# Patient Record
Sex: MALE | Race: OTHER | HISPANIC OR LATINO | Employment: OTHER | URBAN - METROPOLITAN AREA
[De-identification: names, ages, dates, MRNs, and addresses within clinical notes are randomized per-mention and may not be internally consistent; named-entity substitution may affect disease eponyms.]

---

## 2024-02-14 DIAGNOSIS — K42.9 UMBILICAL HERNIA WITHOUT OBSTRUCTION AND WITHOUT GANGRENE: Primary | ICD-10-CM

## 2024-04-02 ENCOUNTER — OFFICE VISIT (OUTPATIENT)
Dept: SURGERY | Facility: CLINIC | Age: 59
End: 2024-04-02
Payer: MEDICAID

## 2024-04-02 VITALS
HEIGHT: 70 IN | RESPIRATION RATE: 19 BRPM | OXYGEN SATURATION: 97 % | DIASTOLIC BLOOD PRESSURE: 85 MMHG | BODY MASS INDEX: 34.65 KG/M2 | HEART RATE: 88 BPM | SYSTOLIC BLOOD PRESSURE: 123 MMHG | TEMPERATURE: 98 F | WEIGHT: 242 LBS

## 2024-04-02 DIAGNOSIS — K42.9 UMBILICAL HERNIA WITHOUT OBSTRUCTION AND WITHOUT GANGRENE: ICD-10-CM

## 2024-04-02 PROCEDURE — 99214 OFFICE O/P EST MOD 30 MIN: CPT | Mod: PBBFAC

## 2024-04-02 RX ORDER — PREDNISONE 20 MG/1
20 TABLET ORAL DAILY
COMMUNITY
Start: 2024-04-01 | End: 2024-04-06

## 2024-04-02 RX ORDER — CEFDINIR 300 MG/1
300 CAPSULE ORAL DAILY
COMMUNITY
Start: 2024-04-01 | End: 2024-04-08

## 2024-04-02 RX ORDER — PROMETHAZINE HYDROCHLORIDE AND DEXTROMETHORPHAN HYDROBROMIDE 6.25; 15 MG/5ML; MG/5ML
5 SYRUP ORAL 4 TIMES DAILY PRN
COMMUNITY
Start: 2024-04-01 | End: 2024-04-06

## 2024-04-02 NOTE — PROGRESS NOTES
General Surgery Clinic Note      CC: ***     Subjective  Akbar Roy is a 58 y.o. male PMH of nephrolithiasis and gout presents with concern for umbilical hernia. He noticed it 8 years ago and it has progressively grown in size. He stated normally the hernia isn't painful but when lifting or coughing it will bulge out more and become painful. 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 He is concerned that the hernia will continue to grow in size and eventually interfere with his work. He follows with family medicine for his PCP, and they referred him to surgery clinic for the hernia. The only medication he takes is ibuprofen occasionally for gout. He denies taking blood thinners. He denies other symptoms associated with the hernia such as fever, n/v, chills, bowel movement changes, and skin changes. He had a L inguinal hernia repair about 30 years ago. BMI 34.72     Review of Systems   Constitutional:  Negative for chills, diaphoresis, fever, malaise/fatigue and weight loss.   Gastrointestinal:  Negative for abdominal pain, constipation, diarrhea, heartburn, nausea and vomiting.   Skin:  Negative for itching and rash.        Color changes to area around umbilicus   Neurological:  Negative for weakness and headaches.        PMHx: History reviewed. No pertinent past medical history.  PSHx: History reviewed. No pertinent surgical history.  FHx: History reviewed. No pertinent family history.  Meds:   Current Outpatient Medications on File Prior to Visit   Medication Sig Dispense Refill    cefdinir (OMNICEF) 300 MG capsule Take 300 mg by mouth once daily.      predniSONE (DELTASONE) 20 MG tablet Take 20 mg by mouth once daily.      promethazine-dextromethorphan (PROMETHAZINE-DM) 6.25-15 mg/5 mL Syrp Take 5 mLs by mouth 4 (four) times daily as needed.       No current facility-administered medications on file prior to visit.     Social:   Cigarette smoking: tried smoking for 1  month at 17 years old  Alcohol: beer occasionally with meals  Illicit substances: none  Work: home/ building   Allergies: Review of patient's allergies indicates:  No Known Allergies     Objective  Vitals:    04/02/24 0839   BP: 123/85   Pulse:    Resp:    Temp:         Gen: Pt in NAD, resting comfortably  HEENT: Atraumatic, MMM, EOMs intact; PERRLA  CV: RR  Resp: NWOB on room air  Abd: NTND, fully reducible umbilical hernia, lower left well healed inguinal hernia repair scar      Assessment/Plan  Akbar Roy is a 58 y.o. male PMH of nephrolithiasis and gout presents with concern for umbilical hernia. The umbilical hernia isn't tender or painful unless he lifts or coughs. There's no skin discolorations present, and it's fully reducible. Patient understands that the umbilical hernia needs surgical repair, however, he has difficulty taking off work. He would need more time to prepare and budget. His BMI is 34.7 which is close to the cut off of BMI 35.    -Recommend exercise programs and changing to diet to achieve weight loss  -RTC in 3 months  -Go to ED if no hernia no longer reducible; he has fever, n/v, bowel movement changes, skin discolorations around umbilical hernia      Soheila Mccollum MS3

## 2024-04-02 NOTE — PROGRESS NOTES
General Surgery Clinic H&P Note      CC: umbilical hernia     Subjective  Akbar Roy is a 58 y.o. male PMH of nephrolithiasis, gout, obesity (BMI 34.7) who was referred to general surgery clinic for evaluation for umbilical hernia. He noticed it 8 years ago and it has progressively grown in size. He stated normally the hernia isn't painful but when lifting or coughing it will bulge out more and become painful. It has always been reducible. He works as a house and building . He denies fever, n/v, changes in bowel habits, or overlying skin changes.     He denies taking blood thinners. He is a former smoker. He had an open L inguinal hernia repair about 30 years ago, he thinks they placed mesh.    ROS: negative, except as per HPI     PMHx: As per HPI  PSHx: As per HPI  FHx: Not reviewed    Meds:   Current Outpatient Medications on File Prior to Visit   Medication Sig Dispense Refill    cefdinir (OMNICEF) 300 MG capsule Take 300 mg by mouth once daily.      predniSONE (DELTASONE) 20 MG tablet Take 20 mg by mouth once daily.      promethazine-dextromethorphan (PROMETHAZINE-DM) 6.25-15 mg/5 mL Syrp Take 5 mLs by mouth 4 (four) times daily as needed.       No current facility-administered medications on file prior to visit.     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 male PMH of nephrolithiasis and gout presents with a reducible umbilical hernia.       - No emergent need for  surgical intervention at this time. Patient would benefit from hernia repair and is interested in it, however he is unable to perform lighter duty at work at this time.   - Patient just below BMI cutoff for elective repair; discussed with patient importance of weight loss to improve durability of repair. Discussed with patient that he would need to lose at least 10lbs to ensure he is further below this cutoff. Discussed lifestyle modifications including diet changes and increasing exercise to achieve this. Patient expresses understanding  - Discussed return precautions in patient including but not limited to fevers/chills, increased abdominal pain, n/v, changes in bowel habits, specifically inability to have bowel movements.  - RTC in 3 mo     Soheila Mccollum MS3    PGY-3 Addendum  Patient seen and examined, chart reviewed independently and with medical student. Agree with findings, changes made accordingly.    Scottie Laureano MD  [facility], PGY-3

## 2024-07-02 ENCOUNTER — OFFICE VISIT (OUTPATIENT)
Dept: SURGERY | Facility: CLINIC | Age: 59
End: 2024-07-02
Payer: MEDICAID

## 2024-07-02 VITALS
HEIGHT: 70 IN | RESPIRATION RATE: 18 BRPM | BODY MASS INDEX: 34.5 KG/M2 | OXYGEN SATURATION: 98 % | DIASTOLIC BLOOD PRESSURE: 95 MMHG | WEIGHT: 241 LBS | HEART RATE: 60 BPM | SYSTOLIC BLOOD PRESSURE: 149 MMHG

## 2024-07-02 DIAGNOSIS — K42.9 UMBILICAL HERNIA WITHOUT OBSTRUCTION AND WITHOUT GANGRENE: Primary | ICD-10-CM

## 2024-07-02 PROCEDURE — 99213 OFFICE O/P EST LOW 20 MIN: CPT | Mod: PBBFAC | Performed by: SURGERY

## 2024-07-02 NOTE — PROGRESS NOTES
[facility] GENERAL SURGERY   Clinic Note       CC: umbilical hernia       HPI: Akbar Roy is a 58 y.o. male with PMHx of of nephrolithiasis, gout, and obesity (BMI 34.58) who is here for a 3 month f/u for his umbilical hernia. The hernia has been present for about 8 years and has progressively grown in size, including a small amount in the past 3 months. 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 He denies any pain except when direct, heavy pressure is applied to the hernia. It is easily reducible with no overlying skin changes. While working, he wears a hernia belt. He denies any fever, chills, n/v, changes in bowel movement, difficulty urinating, chest pain, or shortness of breath.     He denies taking blood thinners. He takes no daily medications. He is a former smoker. He had an open L inguinal hernia repair approximately 30 years ago, during which he thinks they placed a mesh.     ROS: negative, except per HPI      Physical Exam:   Vitals:    07/02/24 0838   BP: (!) 149/95   Pulse:    Resp:       Gen: NAD, resting comfortably  CV: RR  Pulm: NWOB on RA  Abd: non-tender, non-distended; easily reducible, umbilical hernia, fascia defect palpable and appears to be approximately >2cm, no overlying skin changes  Ext:  Move all 4 extremities.           A/P: Akbar Roy is a 58 y.o. male with PMHx of nephrolithaisis and gout presents with a reducible umbilical hernia.        - No emergent need for surgical intervention at this time. Patient would benefit from hernia repair and is interested in it, but would like to lose 10-15 pounds before the surgery to improve outcome and reduce chance of complications. Discussed what an umbilical hernia is and how they occur. Discussed BMI cutoff for elective surgery repair and the benefits of weight loss. Patient expresses  understanding.  -Discussed return precautions with patient including but not limited to fevers/chills, increased abdominal pain, n/v, changes in bowel movements, specifically inability to have bowel movements, and changes in urination.  - Instructed patient to call clinic when he has lost 10-15 pounds to schedule clinic appointment. Given clinic phone number.     Trini Mart, U MS3    I have reviewed the notes, assessments, I concur with her/his documentation of Akbar Roy. Edited as necessary    Berto Whitehead MD  [facility]

## 2024-07-02 NOTE — PROGRESS NOTES
[facility] GENERAL SURGERY   Clinic Note       CC: umbilical hernia       HPI: Akbar Roy is a 58 y.o. male with PMHx of of nephrolithiasis, gout, and obesity (BMI 34.58) who is here for a 3 month f/u for his umbilical hernia. The hernia has been present for about 8 years and has progressively grown in size, including a small amount in the past 3 months. 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 He denies any pain except when direct, heavy pressure is applied to the hernia. It is easily reducible with no overlying skin changes. While working, he wears a hernia belt. He denies any fever, chills, n/v, changes in bowel movement, difficulty urinating, chest pain, or shortness of breath.     He denies taking blood thinners. He takes no daily medications. He is a former smoker. He had an open L inguinal hernia repair approximately 30 years ago, during which he thinks they placed a mesh.     ROS: negative, except per HPI      Physical Exam:   Vitals:    07/02/24 0838   BP: (!) 149/95   Pulse:    Resp:       Gen: NAD, resting comfortably  CV: RR  Pulm: NWOB on RA  Abd: non-tender, non-distended; easily reducible, umbilical hernia, fascia defect palpable and appears to be approximately >2cm, no overlying skin changes  Ext:  Move all 4 extremities.           A/P: Akbar Roy is a 58 y.o. male with PMHx of nephrolithaisis and gout presents with a reducible umbilical hernia.        - No emergent need for surgical intervention at this time. Patient would benefit from hernia repair and is interested in it, but would like to lose 10-15 pounds before the surgery to improve outcome and reduce chance of complications. Discussed what an umbilical hernia is and how they occur. Discussed BMI cutoff for elective surgery repair and the benefits of weight loss. Patient expresses  understanding.  -Discussed return precautions with patient including but not limited to fevers/chills, increased abdominal pain, n/v, changes in bowel movements, specifically inability to have bowel movements, and changes in urination.  - Instructed patient to call clinic when he has lost 10-15 pounds to schedule clinic appointment. Given clinic phone number.     Trini Mart, U MS3    I have reviewed the notes, assessments, I concur with her/his documentation of Akbar Roy. Edited as necessary    Berto Whitehead MD  [facility]

## 2024-07-02 NOTE — PROGRESS NOTES
[facility] GENERAL SURGERY   Clinic Note       CC: umbilical hernia       HPI: Akbar Roy is a 58 y.o. male with PMHx of of nephrolithiasis, gout, and obesity (BMI 34.58) who is here for a 3 month f/u for his umbilical hernia. The hernia has been present for about 8 years and has progressively grown in size, including a small amount in the past 3 months. 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 He denies any pain except when direct, heavy pressure is applied to the hernia. It is easily reducible with no overlying skin changes. While working, he wears a hernia belt. He denies any fever, chills, n/v, changes in bowel movement, difficulty urinating, chest pain, or shortness of breath.     He denies taking blood thinners. He takes no daily medications. He is a former smoker. He had an open L inguinal hernia repair approximately 30 years ago, during which he thinks they placed a mesh.     ROS: negative, except per HPI      Physical Exam:   Vitals:    07/02/24 0838   BP: (!) 149/95   Pulse:    Resp:       Gen: NAD, resting comfortably  CV: RR  Pulm: NWOB on RA  Abd: non-tender, non-distended; easily reducible, umbilical hernia, fascia defect palpable and appears to be approximately >2cm, no overlying skin changes  Ext:  Move all 4 extremities.           A/P: Akbar Roy is a 58 y.o. male with PMHx of nephrolithaisis and gout presents with a reducible umbilical hernia.        - No emergent need for surgical intervention at this time. Patient would benefit from hernia repair and is interested in it, but would like to lose 10-15 pounds before the surgery to improve outcome and reduce chance of complications. Discussed what an umbilical hernia is and how they occur. Discussed BMI cutoff for elective surgery repair and the benefits of weight loss. Patient expresses  understanding.  -Discussed return precautions with patient including but not limited to fevers/chills, increased abdominal pain, n/v, changes in bowel movements, specifically inability to have bowel movements, and changes in urination.  - Instructed patient to call clinic when he has lost 10-15 pounds to schedule clinic appointment. Given clinic phone number.     Trini Mart, U MS3    I have reviewed the notes, assessments, I concur with her/his documentation of Akbar Roy. Edited as necessary    Berto Whitehead MD  [facility]

## 2024-08-13 ENCOUNTER — OFFICE VISIT (OUTPATIENT)
Dept: SURGERY | Facility: CLINIC | Age: 59
End: 2024-08-13
Payer: COMMERCIAL

## 2024-08-13 VITALS
OXYGEN SATURATION: 97 % | RESPIRATION RATE: 18 BRPM | DIASTOLIC BLOOD PRESSURE: 77 MMHG | WEIGHT: 233 LBS | HEIGHT: 70 IN | BODY MASS INDEX: 33.36 KG/M2 | SYSTOLIC BLOOD PRESSURE: 121 MMHG | HEART RATE: 77 BPM | TEMPERATURE: 98 F

## 2024-08-13 DIAGNOSIS — K42.9 UMBILICAL HERNIA WITHOUT OBSTRUCTION AND WITHOUT GANGRENE: Primary | ICD-10-CM

## 2024-08-13 PROCEDURE — 99215 OFFICE O/P EST HI 40 MIN: CPT | Mod: PBBFAC

## 2024-08-13 RX ORDER — HEPARIN SODIUM 5000 [USP'U]/ML
5000 INJECTION, SOLUTION INTRAVENOUS; SUBCUTANEOUS ONCE
OUTPATIENT
Start: 2024-08-21

## 2024-08-13 RX ORDER — SODIUM CHLORIDE 9 MG/ML
INJECTION, SOLUTION INTRAVENOUS CONTINUOUS
OUTPATIENT
Start: 2024-08-13

## 2024-08-13 RX ORDER — CEFAZOLIN SODIUM 2 G/50ML
2 SOLUTION INTRAVENOUS
OUTPATIENT
Start: 2024-08-21

## 2024-08-13 NOTE — PROGRESS NOTES
Patient seen in gen surg clinic by Dr Menendez.  Surgery scheduled for 8/21/24 with a 2 week post op follow up.  Pre op information along with CONSUELO wipes given and explained.

## 2024-08-13 NOTE — PROGRESS NOTES
[facility] GENERAL SURGERY   Clinic Note       CC: umbilical hernia       HPI: Akbar Roy is a 58 y.o. male with PMHx of of nephrolithiasis, gout, and obesity (BMI 34.58) who is here for a f/u after losing 12lbs ready to seek umbilical hernia repair. The hernia has been present for about 8 years and has progressively grown in size, including a small amount in the past 3 months. He denies any pain except when direct, heavy pressure is applied to the hernia. It is easily reducible with no overlying skin changes. While working, he wears a hernia belt. He denies any fever, chills, n/v, changes in bowel movement, difficulty urinating, chest pain, or shortness of breath.     He denies taking blood thinners. He takes no daily medications. He is a former smoker. He had an open L inguinal hernia repair approximately 30 years ago, during which he thinks they placed a mesh.     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 Discussed the importance of 4-6 weeks of strict heavy lifting restrictions of adequate healing and decreasing the chance of recurrence. At this time, patient would like to pursue repair.     ROS: negative, except per HPI      Physical Exam:   Vitals:    08/13/24 0852   BP: 121/77   Pulse: 77   Resp: 18   Temp: 97.9 °F (36.6 °C)      Gen: NAD, resting comfortably  CV: RR  Pulm: NWOB on RA  Abd: non-tender, non-distended; easily reducible, umbilical hernia, fascia defect palpable and appears to be approximately >2cm, no overlying skin changes  Ext:  Move all 4 extremities.           A/P: Akbar Roy is a 58 y.o. male with PMHx of nephrolithaisis and gout presents with a reducible umbilical hernia.        - Scheduled for laparoscopic umbilical hernia repair with mesh on 8/21   - Consented; risks, benefits and alternatives discussed and questions/concerns addressed   - Discussed return precautions with patient including but not limited to fevers/chills, increased abdominal pain, n/v, changes in bowel movements,  specifically inability to have bowel movements, and changes in urination.    Rosio oGnzalez MD   [facility] PGY-1

## 2024-08-13 NOTE — H&P (VIEW-ONLY)
[facility] GENERAL SURGERY   Clinic Note       CC: umbilical hernia       HPI: Akbar Roy is a 58 y.o. male with PMHx of of nephrolithiasis, gout, and obesity (BMI 34.58) who is here for a f/u after losing 12lbs ready to seek umbilical hernia repair. The hernia has been present for about 8 years and has progressively grown in size, including a small amount in the past 3 months. He denies any pain except when direct, heavy pressure is applied to the hernia. It is easily reducible with no overlying skin changes. While working, he wears a hernia belt. He denies any fever, chills, n/v, changes in bowel movement, difficulty urinating, chest pain, or shortness of breath.     He denies taking blood thinners. He takes no daily medications. He is a former smoker. He had an open L inguinal hernia repair approximately 30 years ago, during which he thinks they placed a mesh.     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 Discussed the importance of 4-6 weeks of strict heavy lifting restrictions of adequate healing and decreasing the chance of recurrence. At this time, patient would like to pursue repair.     ROS: negative, except per HPI      Physical Exam:   Vitals:    08/13/24 0852   BP: 121/77   Pulse: 77   Resp: 18   Temp: 97.9 °F (36.6 °C)      Gen: NAD, resting comfortably  CV: RR  Pulm: NWOB on RA  Abd: non-tender, non-distended; easily reducible, umbilical hernia, fascia defect palpable and appears to be approximately >2cm, no overlying skin changes  Ext:  Move all 4 extremities.           A/P: Akbar Roy is a 58 y.o. male with PMHx of nephrolithaisis and gout presents with a reducible umbilical hernia.        - Scheduled for laparoscopic umbilical hernia repair with mesh on 8/21   - Consented; risks, benefits and alternatives discussed and questions/concerns addressed   - Discussed return precautions with patient including but not limited to fevers/chills, increased abdominal pain, n/v, changes in bowel movements,  specifically inability to have bowel movements, and changes in urination.    Rosio Gonzalez MD   [facility] PGY-1

## 2024-08-14 ENCOUNTER — ANESTHESIA EVENT (OUTPATIENT)
Dept: SURGERY | Facility: HOSPITAL | Age: 59
End: 2024-08-14
Payer: MEDICAID

## 2024-08-14 NOTE — ANESTHESIA PREPROCEDURE EVALUATION
Akbar Roy is a 58 y.o. male PRESENTING FOR REPAIR, HERNIA, UMBILICAL, LAPAROSCOPIC (Abdomen) with a history of   -UMBILICAL  HERNIA  -OBESITY, BMI 33  -FORMER SMOKER    BETA-BLOCKER: NONE    New Orders for Anesthesia: CMP    Active Ambulatory Problems     Diagnosis Date Noted    Umbilical hernia without obstruction and without gangrene 07/02/2024     Resolved Ambulatory Problems     Diagnosis Date Noted    No Resolved Ambulatory Problems     No Additional Past Medical History     Pre-op Assessment    I have reviewed the NPO Status.      Review of Systems  Anesthesia Hx:  No problems with previous Anesthesia                Social:  Former Smoker       Cardiovascular:  Cardiovascular Normal                                            Pulmonary:  Pulmonary Normal                       Renal/:   renal calculi               Hepatic/GI:  Hepatic/GI Normal                 Neurological:  Neurology Normal                                      Endocrine:        Obesity / BMI > 30    Vitals:    08/21/24 0850 08/21/24 0859   BP:  (!) 154/92   Pulse:  88   Resp:  18   Temp:  36.9 °C (98.5 °F)   TempSrc:  Oral   SpO2:  97%   Weight: 101.6 kg (224 lb)          Physical Exam  General: Alert, Cooperative and Well nourished    Airway:  Mallampati: II   Mouth Opening: Normal  TM Distance: Normal  Tongue: Normal  Neck ROM: Normal ROM    Dental:  Intact    Chest/Lungs:  Clear to auscultation, Normal Respiratory Rate    Heart:  Rate: Normal  Rhythm: Regular Rhythm  Sounds: Normal    CMP  Sodium   Date Value Ref Range Status   08/21/2024 142 136 - 145 mmol/L Final     Potassium   Date Value Ref Range Status   08/21/2024 4.0 3.5 - 5.1 mmol/L Final     Chloride   Date Value Ref Range Status   08/21/2024 108 (H) 98 - 107 mmol/L Final     CO2   Date Value Ref Range Status   08/21/2024 26 22 - 29 mmol/L Final     Blood Urea Nitrogen   Date Value Ref Range Status   08/21/2024 10.0 8.4 - 25.7 mg/dL Final     Creatinine   Date Value Ref Range  Status   08/21/2024 1.06 0.73 - 1.18 mg/dL Final     Calcium   Date Value Ref Range Status   08/21/2024 10.0 8.4 - 10.2 mg/dL Final     Albumin   Date Value Ref Range Status   08/21/2024 4.0 3.5 - 5.0 g/dL Final     Bilirubin Total   Date Value Ref Range Status   08/21/2024 0.4 <=1.5 mg/dL Final     ALP   Date Value Ref Range Status   08/21/2024 68 40 - 150 unit/L Final     AST   Date Value Ref Range Status   08/21/2024 18 5 - 34 unit/L Final     ALT   Date Value Ref Range Status   08/21/2024 26 0 - 55 unit/L Final     eGFR   Date Value Ref Range Status   08/21/2024 >60 mL/min/1.73/m2 Final         Anesthesia Plan  Type of Anesthesia, risks & benefits discussed:    Anesthesia Type: Gen ETT  Intra-op Monitoring Plan: Standard ASA Monitors  Post Op Pain Control Plan: IV/PO Opioids PRN  Induction:  IV  Airway Plan: Direct  Informed Consent: Informed consent signed with the Patient and all parties understand the risks and agree with anesthesia plan.  All questions answered.   ASA Score: 2  Day of Surgery Review of History & Physical: H&P Update referred to the surgeon/provider.    Ready For Surgery From Anesthesia Perspective.     .

## 2024-08-20 ENCOUNTER — PATIENT MESSAGE (OUTPATIENT)
Dept: SURGERY | Facility: HOSPITAL | Age: 59
End: 2024-08-20
Payer: MEDICAID

## 2024-08-21 ENCOUNTER — ANESTHESIA (OUTPATIENT)
Dept: SURGERY | Facility: HOSPITAL | Age: 59
End: 2024-08-21
Payer: MEDICAID

## 2024-08-21 ENCOUNTER — HOSPITAL ENCOUNTER (OUTPATIENT)
Facility: HOSPITAL | Age: 59
Discharge: HOME | End: 2024-08-21
Attending: STUDENT IN AN ORGANIZED HEALTH CARE EDUCATION/TRAINING PROGRAM | Admitting: STUDENT IN AN ORGANIZED HEALTH CARE EDUCATION/TRAINING PROGRAM
Payer: MEDICAID

## 2024-08-21 DIAGNOSIS — K42.9 UMBILICAL HERNIA WITHOUT OBSTRUCTION AND WITHOUT GANGRENE: ICD-10-CM

## 2024-08-21 DIAGNOSIS — K42.0 INCARCERATED UMBILICAL HERNIA: Primary | ICD-10-CM

## 2024-08-21 LAB
ALBUMIN SERPL-MCNC: 4 G/DL (ref 3.5–5)
ALBUMIN/GLOB SERPL: 1.3 RATIO (ref 1.1–2)
ALP SERPL-CCNC: 68 UNIT/L (ref 40–150)
ALT SERPL-CCNC: 26 UNIT/L (ref 0–55)
ANION GAP SERPL CALC-SCNC: 8 MEQ/L
AST SERPL-CCNC: 18 UNIT/L (ref 5–34)
BILIRUB SERPL-MCNC: 0.4 MG/DL
BUN SERPL-MCNC: 10 MG/DL (ref 8.4–25.7)
CALCIUM SERPL-MCNC: 10 MG/DL (ref 8.4–10.2)
CHLORIDE SERPL-SCNC: 108 MMOL/L (ref 98–107)
CO2 SERPL-SCNC: 26 MMOL/L (ref 22–29)
CREAT SERPL-MCNC: 1.06 MG/DL (ref 0.73–1.18)
CREAT/UREA NIT SERPL: 9
GFR SERPLBLD CREATININE-BSD FMLA CKD-EPI: >60 ML/MIN/1.73/M2
GLOBULIN SER-MCNC: 3.2 GM/DL (ref 2.4–3.5)
GLUCOSE SERPL-MCNC: 119 MG/DL (ref 74–100)
POTASSIUM SERPL-SCNC: 4 MMOL/L (ref 3.5–5.1)
PROT SERPL-MCNC: 7.2 GM/DL (ref 6.4–8.3)
SODIUM SERPL-SCNC: 142 MMOL/L (ref 136–145)

## 2024-08-21 PROCEDURE — 37000009 HC ANESTHESIA EA ADD 15 MINS: Performed by: STUDENT IN AN ORGANIZED HEALTH CARE EDUCATION/TRAINING PROGRAM

## 2024-08-21 PROCEDURE — 80053 COMPREHEN METABOLIC PANEL: CPT | Performed by: NURSE PRACTITIONER

## 2024-08-21 PROCEDURE — D9220A PRA ANESTHESIA: Mod: CRNA,,, | Performed by: NURSE ANESTHETIST, CERTIFIED REGISTERED

## 2024-08-21 PROCEDURE — 63600175 PHARM REV CODE 636 W HCPCS: Performed by: NURSE ANESTHETIST, CERTIFIED REGISTERED

## 2024-08-21 PROCEDURE — 63600175 PHARM REV CODE 636 W HCPCS

## 2024-08-21 PROCEDURE — 37000008 HC ANESTHESIA 1ST 15 MINUTES: Performed by: STUDENT IN AN ORGANIZED HEALTH CARE EDUCATION/TRAINING PROGRAM

## 2024-08-21 PROCEDURE — 36000708 HC OR TIME LEV III 1ST 15 MIN: Performed by: STUDENT IN AN ORGANIZED HEALTH CARE EDUCATION/TRAINING PROGRAM

## 2024-08-21 PROCEDURE — D9220A PRA ANESTHESIA: Mod: ANES,,, | Performed by: ANESTHESIOLOGY

## 2024-08-21 PROCEDURE — 27201423 OPTIME MED/SURG SUP & DEVICES STERILE SUPPLY: Performed by: STUDENT IN AN ORGANIZED HEALTH CARE EDUCATION/TRAINING PROGRAM

## 2024-08-21 PROCEDURE — 63600175 PHARM REV CODE 636 W HCPCS: Mod: JZ,JG | Performed by: STUDENT IN AN ORGANIZED HEALTH CARE EDUCATION/TRAINING PROGRAM

## 2024-08-21 PROCEDURE — 25000003 PHARM REV CODE 250: Performed by: STUDENT IN AN ORGANIZED HEALTH CARE EDUCATION/TRAINING PROGRAM

## 2024-08-21 PROCEDURE — 25000003 PHARM REV CODE 250: Performed by: ANESTHESIOLOGY

## 2024-08-21 PROCEDURE — 71000016 HC POSTOP RECOV ADDL HR: Performed by: STUDENT IN AN ORGANIZED HEALTH CARE EDUCATION/TRAINING PROGRAM

## 2024-08-21 PROCEDURE — 71000015 HC POSTOP RECOV 1ST HR: Performed by: STUDENT IN AN ORGANIZED HEALTH CARE EDUCATION/TRAINING PROGRAM

## 2024-08-21 PROCEDURE — 36000709 HC OR TIME LEV III EA ADD 15 MIN: Performed by: STUDENT IN AN ORGANIZED HEALTH CARE EDUCATION/TRAINING PROGRAM

## 2024-08-21 PROCEDURE — 63600175 PHARM REV CODE 636 W HCPCS: Performed by: ANESTHESIOLOGY

## 2024-08-21 PROCEDURE — C1781 MESH (IMPLANTABLE): HCPCS | Performed by: STUDENT IN AN ORGANIZED HEALTH CARE EDUCATION/TRAINING PROGRAM

## 2024-08-21 PROCEDURE — 71000033 HC RECOVERY, INTIAL HOUR: Performed by: STUDENT IN AN ORGANIZED HEALTH CARE EDUCATION/TRAINING PROGRAM

## 2024-08-21 PROCEDURE — 25000003 PHARM REV CODE 250: Performed by: NURSE ANESTHETIST, CERTIFIED REGISTERED

## 2024-08-21 DEVICE — VENTRALIGHT ST MESH
Type: IMPLANTABLE DEVICE | Site: ABDOMEN | Status: FUNCTIONAL
Brand: VENTRALIGHT ST

## 2024-08-21 RX ORDER — LIDOCAINE HYDROCHLORIDE 20 MG/ML
INJECTION INTRAVENOUS
Status: DISCONTINUED | OUTPATIENT
Start: 2024-08-21 | End: 2024-08-21

## 2024-08-21 RX ORDER — MEPERIDINE HYDROCHLORIDE 25 MG/ML
12.5 INJECTION INTRAMUSCULAR; INTRAVENOUS; SUBCUTANEOUS EVERY 10 MIN PRN
Status: DISCONTINUED | OUTPATIENT
Start: 2024-08-21 | End: 2024-08-21 | Stop reason: HOSPADM

## 2024-08-21 RX ORDER — PHENYLEPHRINE HYDROCHLORIDE 10 MG/ML
INJECTION INTRAVENOUS
Status: DISCONTINUED | OUTPATIENT
Start: 2024-08-21 | End: 2024-08-21

## 2024-08-21 RX ORDER — OXYCODONE HYDROCHLORIDE 5 MG/1
5 TABLET ORAL
Status: DISCONTINUED | OUTPATIENT
Start: 2024-08-21 | End: 2024-08-21 | Stop reason: HOSPADM

## 2024-08-21 RX ORDER — PROMETHAZINE HYDROCHLORIDE 25 MG/ML
INJECTION, SOLUTION INTRAMUSCULAR; INTRAVENOUS
Status: COMPLETED
Start: 2024-08-21 | End: 2024-08-21

## 2024-08-21 RX ORDER — HEPARIN SODIUM 5000 [USP'U]/ML
5000 INJECTION, SOLUTION INTRAVENOUS; SUBCUTANEOUS ONCE
Status: COMPLETED | OUTPATIENT
Start: 2024-08-21 | End: 2024-08-21

## 2024-08-21 RX ORDER — DEXAMETHASONE SODIUM PHOSPHATE 4 MG/ML
INJECTION, SOLUTION INTRA-ARTICULAR; INTRALESIONAL; INTRAMUSCULAR; INTRAVENOUS; SOFT TISSUE
Status: DISCONTINUED | OUTPATIENT
Start: 2024-08-21 | End: 2024-08-21

## 2024-08-21 RX ORDER — CEFAZOLIN SODIUM 1 G/3ML
2 INJECTION, POWDER, FOR SOLUTION INTRAMUSCULAR; INTRAVENOUS
Status: COMPLETED | OUTPATIENT
Start: 2024-08-21 | End: 2024-08-21

## 2024-08-21 RX ORDER — ONDANSETRON HYDROCHLORIDE 2 MG/ML
INJECTION, SOLUTION INTRAVENOUS
Status: DISCONTINUED | OUTPATIENT
Start: 2024-08-21 | End: 2024-08-21

## 2024-08-21 RX ORDER — LABETALOL HYDROCHLORIDE 5 MG/ML
INJECTION, SOLUTION INTRAVENOUS
Status: DISCONTINUED | OUTPATIENT
Start: 2024-08-21 | End: 2024-08-21

## 2024-08-21 RX ORDER — BUPIVACAINE HYDROCHLORIDE 2.5 MG/ML
INJECTION, SOLUTION EPIDURAL; INFILTRATION; INTRACAUDAL
Status: DISCONTINUED | OUTPATIENT
Start: 2024-08-21 | End: 2024-08-21 | Stop reason: HOSPADM

## 2024-08-21 RX ORDER — SODIUM CHLORIDE 0.9 % (FLUSH) 0.9 %
10 SYRINGE (ML) INJECTION
Status: DISCONTINUED | OUTPATIENT
Start: 2024-08-21 | End: 2024-08-21 | Stop reason: HOSPADM

## 2024-08-21 RX ORDER — ONDANSETRON 4 MG/1
4 TABLET, ORALLY DISINTEGRATING ORAL 2 TIMES DAILY
Qty: 20 TABLET | Refills: 0 | Status: SHIPPED | OUTPATIENT
Start: 2024-08-21

## 2024-08-21 RX ORDER — HYDROCODONE BITARTRATE AND ACETAMINOPHEN 5; 325 MG/1; MG/1
1 TABLET ORAL EVERY 8 HOURS PRN
Qty: 7 TABLET | Refills: 0 | Status: CANCELLED | OUTPATIENT
Start: 2024-08-21

## 2024-08-21 RX ORDER — ONDANSETRON HYDROCHLORIDE 2 MG/ML
4 INJECTION, SOLUTION INTRAVENOUS DAILY PRN
Status: DISCONTINUED | OUTPATIENT
Start: 2024-08-21 | End: 2024-08-21 | Stop reason: HOSPADM

## 2024-08-21 RX ORDER — LIDOCAINE HYDROCHLORIDE AND EPINEPHRINE 20; 10 MG/ML; UG/ML
INJECTION, SOLUTION INFILTRATION; PERINEURAL
Status: DISCONTINUED | OUTPATIENT
Start: 2024-08-21 | End: 2024-08-21 | Stop reason: HOSPADM

## 2024-08-21 RX ORDER — PROPOFOL 10 MG/ML
VIAL (ML) INTRAVENOUS
Status: DISCONTINUED | OUTPATIENT
Start: 2024-08-21 | End: 2024-08-21

## 2024-08-21 RX ORDER — OXYCODONE HYDROCHLORIDE 5 MG/1
5 TABLET ORAL EVERY 6 HOURS PRN
Qty: 16 TABLET | Refills: 0 | Status: SHIPPED | OUTPATIENT
Start: 2024-08-21

## 2024-08-21 RX ORDER — MIDAZOLAM HYDROCHLORIDE 2 MG/2ML
2 INJECTION, SOLUTION INTRAMUSCULAR; INTRAVENOUS ONCE AS NEEDED
Status: COMPLETED | OUTPATIENT
Start: 2024-08-21 | End: 2024-08-21

## 2024-08-21 RX ORDER — GLUCAGON 1 MG
1 KIT INJECTION
Status: DISCONTINUED | OUTPATIENT
Start: 2024-08-21 | End: 2024-08-21 | Stop reason: HOSPADM

## 2024-08-21 RX ORDER — FENTANYL CITRATE 50 UG/ML
INJECTION, SOLUTION INTRAMUSCULAR; INTRAVENOUS
Status: DISCONTINUED | OUTPATIENT
Start: 2024-08-21 | End: 2024-08-21

## 2024-08-21 RX ORDER — SODIUM CHLORIDE 9 MG/ML
INJECTION, SOLUTION INTRAVENOUS CONTINUOUS
Status: DISCONTINUED | OUTPATIENT
Start: 2024-08-21 | End: 2024-08-21 | Stop reason: HOSPADM

## 2024-08-21 RX ORDER — SODIUM CHLORIDE, SODIUM LACTATE, POTASSIUM CHLORIDE, CALCIUM CHLORIDE 600; 310; 30; 20 MG/100ML; MG/100ML; MG/100ML; MG/100ML
INJECTION, SOLUTION INTRAVENOUS CONTINUOUS
Status: DISCONTINUED | OUTPATIENT
Start: 2024-08-21 | End: 2024-08-21 | Stop reason: HOSPADM

## 2024-08-21 RX ORDER — MORPHINE SULFATE 2 MG/ML
2 INJECTION, SOLUTION INTRAMUSCULAR; INTRAVENOUS EVERY 5 MIN PRN
Status: DISCONTINUED | OUTPATIENT
Start: 2024-08-21 | End: 2024-08-21 | Stop reason: HOSPADM

## 2024-08-21 RX ORDER — ROCURONIUM BROMIDE 10 MG/ML
INJECTION, SOLUTION INTRAVENOUS
Status: DISCONTINUED | OUTPATIENT
Start: 2024-08-21 | End: 2024-08-21

## 2024-08-21 RX ADMIN — ROCURONIUM BROMIDE 50 MG: 10 INJECTION INTRAVENOUS at 11:08

## 2024-08-21 RX ADMIN — MORPHINE SULFATE 2 MG: 2 INJECTION, SOLUTION INTRAMUSCULAR; INTRAVENOUS at 02:08

## 2024-08-21 RX ADMIN — OXYCODONE HYDROCHLORIDE 5 MG: 5 TABLET ORAL at 02:08

## 2024-08-21 RX ADMIN — PROPOFOL 200 MG: 10 INJECTION, EMULSION INTRAVENOUS at 11:08

## 2024-08-21 RX ADMIN — DEXAMETHASONE SODIUM PHOSPHATE 8 MG: 4 INJECTION, SOLUTION INTRA-ARTICULAR; INTRALESIONAL; INTRAMUSCULAR; INTRAVENOUS; SOFT TISSUE at 12:08

## 2024-08-21 RX ADMIN — LABETALOL HYDROCHLORIDE 5 MG: 5 INJECTION INTRAVENOUS at 12:08

## 2024-08-21 RX ADMIN — LIDOCAINE HYDROCHLORIDE 100 MG: 20 INJECTION INTRAVENOUS at 11:08

## 2024-08-21 RX ADMIN — FENTANYL CITRATE 100 MCG: 50 INJECTION INTRAMUSCULAR; INTRAVENOUS at 11:08

## 2024-08-21 RX ADMIN — PHENYLEPHRINE HYDROCHLORIDE 200 MCG: 10 INJECTION INTRAVENOUS at 01:08

## 2024-08-21 RX ADMIN — SUGAMMADEX 200 MG: 100 INJECTION, SOLUTION INTRAVENOUS at 01:08

## 2024-08-21 RX ADMIN — MIDAZOLAM HYDROCHLORIDE 2 MG: 1 INJECTION, SOLUTION INTRAMUSCULAR; INTRAVENOUS at 11:08

## 2024-08-21 RX ADMIN — ROCURONIUM BROMIDE 10 MG: 10 INJECTION INTRAVENOUS at 12:08

## 2024-08-21 RX ADMIN — SODIUM CHLORIDE, POTASSIUM CHLORIDE, SODIUM LACTATE AND CALCIUM CHLORIDE: 600; 310; 30; 20 INJECTION, SOLUTION INTRAVENOUS at 11:08

## 2024-08-21 RX ADMIN — ROCURONIUM BROMIDE 10 MG: 10 INJECTION INTRAVENOUS at 01:08

## 2024-08-21 RX ADMIN — MEPERIDINE HYDROCHLORIDE 12.5 MG: 25 INJECTION INTRAMUSCULAR; INTRAVENOUS; SUBCUTANEOUS at 02:08

## 2024-08-21 RX ADMIN — HEPARIN SODIUM 5000 UNITS: 5000 INJECTION, SOLUTION INTRAVENOUS; SUBCUTANEOUS at 09:08

## 2024-08-21 RX ADMIN — SODIUM CHLORIDE, POTASSIUM CHLORIDE, SODIUM LACTATE AND CALCIUM CHLORIDE: 600; 310; 30; 20 INJECTION, SOLUTION INTRAVENOUS at 01:08

## 2024-08-21 RX ADMIN — ONDANSETRON 4 MG: 2 INJECTION INTRAMUSCULAR; INTRAVENOUS at 01:08

## 2024-08-21 RX ADMIN — CEFAZOLIN 2 G: 330 INJECTION, POWDER, FOR SOLUTION INTRAMUSCULAR; INTRAVENOUS at 12:08

## 2024-08-21 RX ADMIN — PROMETHAZINE HYDROCHLORIDE 25 MG: 25 INJECTION INTRAMUSCULAR; INTRAVENOUS at 02:08

## 2024-08-21 RX ADMIN — PHENYLEPHRINE HYDROCHLORIDE 100 MCG: 10 INJECTION INTRAVENOUS at 12:08

## 2024-08-21 NOTE — INTERVAL H&P NOTE
H&P Update    Patient seen and examined this morning. There are no changes to the documented H&P.   **Patient is a [employer] and does not consent to transfusion of blood products**     Patient has been NPO since midnight.     Patient has held home blood thinners for appropriate amount of time: N/A    Positioning: Supine    Pre-operative Heparin Ordered: Yes    Pre-operative Antibiotics Ordered: Yes: Ancef 2g    Laterality Marked: N/A    All questions and concerns addressed. Patient confirms the procedure to be performed: REPAIR, HERNIA, UMBILICAL, LAPAROSCOPIC.     Rosio Gonzalez MD   [facility] PGY-1   08/21/2024 9:24 AM

## 2024-08-21 NOTE — TRANSFER OF CARE
Anesthesia Transfer of Care Note    Patient: Akbar Roy    Procedure(s) Performed: Procedure(s) (LRB):  REPAIR, HERNIA, UMBILICAL, LAPAROSCOPIC (N/A)    Patient location: PACU    Anesthesia Type: general    Transport from OR: Transported from OR on room air with adequate spontaneous ventilation    Post pain: adequate analgesia    Post assessment: no apparent anesthetic complications and tolerated procedure well    Post vital signs: stable    Level of consciousness: sedated    Nausea/Vomiting: no nausea/vomiting    Complications: none    Transfer of care protocol was followed      Last vitals: Visit Vitals  BP (!) 154/92   Pulse 88   Temp 36.9 °C (98.5 °F) (Oral)   Resp 18   Wt 101.6 kg (224 lb)   SpO2 97%   BMI 32.14 kg/m²

## 2024-08-21 NOTE — ANESTHESIA PROCEDURE NOTES
Intubation    Date/Time: 8/21/2024 11:53 AM    Performed by: Salma Priest CRNA  Authorized by: Lindsay Garcia MD    Intubation:     Induction:  Intravenous    Intubated:  Postinduction    Mask Ventilation:  Easy with oral airway    Attempts:  1    Attempted By:  Student (Lucretia [facility])    Method of Intubation:  Direct    Blade:  Victor 2    Laryngeal View Grade: Grade IIA - cords partially seen      Difficult Airway Encountered?: No      Complications:  None    Airway Device:  Oral endotracheal tube    Airway Device Size:  7.5    Style/Cuff Inflation:  Cuffed (inflated to minimal occlusive pressure)    Inflation Amount (mL):  5    Tube secured:  23    Secured at:  The lips    Placement Verified By:  Capnometry    Complicating Factors:  None    Findings Post-Intubation:  BS equal bilateral and atraumatic/condition of teeth unchanged

## 2024-08-21 NOTE — DISCHARGE INSTRUCTIONS
· FOLLOW UP: Appointment in [facility] __September 5th @ 11:00 AM___________.   Regular diet.  · PAIN: Apply ice pack to abdomen as needed for pain. Alternate Tylenol and Ibuprofen (regular over the counter- follow instructions on the bottle). Take Your prescription pain medication AS PRESCRIBED ONLY for severe pain unrelieved by Tylenol (acetaminophen) or Ibuprofen.  · EXAMPLE: Take Tylenol. Three hours later take Ibuprofen. Three hours after that take Tylenol again, and repeat until no longer needed. If Pain is still bad once you start Tylenol and Ibuprofen, add pain medication. Dont drive or drink alcohol with pain medication. Dont take pain medication more often than it is prescribed to be taken.  INFECTION: Call your doctor at [phone] or report to the emergency room:  - if redness around your incision begins to spread, or you have swelling.  - If your incision has opened up  - If you have green, yellow, or cottage cheese-like drainage coming from your incision  - If you begin to run fever over 100.4 F  - if you are feeling weaker  - INCISION (WOUND) CARE: Leave adhesive glue on your skin until the glue peels away on its own. You may take a shower tomorrow. Wash gently over incision site - do not scrub or peel skin glue. Do not soak your wounds in water for 2 weeks. Do not take baths, swim, or use a hot tub until your doctor says it is okay.  Leave pressure dressing in place for 2 days, then can cover with gauze if needed.  Wear abdominal binder while moving and for comfort for 1 week.  · NAUSEA: You can eat and drink whatever you like as tolerated. You may experience post anesthesia nausea. Apply cold cloths to your face and neck and call your doctor for a prescription for nausea medication. If you have any uncontrolled vomiting that is not resolving within a few hours, report to your emergency room. Resume all medications tomorrow.  · ACTIVITY: Do not lift anything that is heavier than 10 lbs. (4.5  kg) for 4 weeks. Return to work when you feel well enough: your pain is controlled without the narcotic pain medication and you feel strong enough. Do not drink alcohol or drive today, or as long as you are on pain medication.  · Notify MD of any moderate to severe pain unrelieved by pain medicine or for any signs of infection including fever above 100.4, excessive redness or swelling, yellow/green foul- smelling drainage, nausea or vomiting. Clinics number is [phone]. If it is after business hours or emergency call [phone] and state Im having post op complications and need to speak to the surgeon on call.  · . Thanks for choosing [facility]! Have a smooth recovery!

## 2024-08-21 NOTE — DISCHARGE SUMMARY
[facility]  General Surgery  Discharge Summary      Patient Name: Akbar Roy  MRN: [id number]  Admission Date: 8/21/2024  Hospital Length of Stay: 0 days  Discharge Date and Time:  08/21/2024 2:09 PM  Attending Physician: Vincent Harman MD   Discharging Provider: Berto Whitehead MD  Primary Care Provider: Ricardo River MD     HPI: Akbar Roy is a 58 y.o. male with PMHx of of nephrolithiasis, gout, and obesity (BMI 34.58) who is here for a f/u after losing 12lbs ready to seek umbilical hernia repair. The hernia has been present for about 8 years and has progressively grown in size, including a small amount in the past 3 months. He denies any pain except when direct, heavy pressure is applied to the hernia. It is easily reducible with no overlying skin changes. While working, he wears a hernia belt. He denies any fever, chills, n/v, changes in bowel movement, difficulty urinating, chest pain, or shortness of breath.      He denies taking blood thinners. He takes no daily medications. He is a former smoker. He had an open L inguinal hernia repair approximately 30 years ago, during which he thinks they placed a mesh.      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 Discussed the importance of 4-6 weeks of strict heavy lifting restrictions of adequate healing and decreasing the chance of recurrence. At this time, patient would like to pursue repair.     Procedure(s) (LRB):  REPAIR, HERNIA, UMBILICAL, LAPAROSCOPIC (N/A)     Hospital Course:  Patient presented for laparoscopic umbilical hernia repair.  Defect was primarily closed and a mesh was tacked to abdominal wall for reinforcement. Patient tolerated procedure well.  Patient now stable for discharge as he has a pain well controlled and tolerating p.o. intake.  Patient will follow up in clinic in 2 weeks.  Pain medicine and antiemetic sent.    Consults:     Significant Diagnostic Studies: N/A    Pending Diagnostic Studies:        None          There are no hospital problems to display for this patient.     Discharged Condition: good    Disposition: Home or Self Care    Follow Up:    Patient Instructions:      Diet Adult Regular     Notify your health care provider if you experience any of the following:  temperature >100.4     Notify your health care provider if you experience any of the following:  persistent nausea and vomiting or diarrhea     Notify your health care provider if you experience any of the following:  severe uncontrolled pain     Notify your health care provider if you experience any of the following:  redness, tenderness, or signs of infection (pain, swelling, redness, odor or green/yellow discharge around incision site)     Lifting restrictions   Order Comments: 10lbs lifting restriction     Activity as tolerated     Medications:  Reconciled Home Medications:      Medication List        START taking these medications      ondansetron 4 MG Tbdl  Commonly known as: ZOFRAN-ODT  Take 1 tablet (4 mg total) by mouth 2 (two) times daily.     oxyCODONE 5 MG immediate release tablet  Commonly known as: ROXICODONE  Take 1 tablet (5 mg total) by mouth every 6 (six) hours as needed for Pain.              Berto Whitehead MD  General Surgery  [facility]

## 2024-08-21 NOTE — OP NOTE
[facility]  General Surgery  Operative Note    SUMMARY     Date of Procedure: 8/21/2024     Procedure: Procedure(s) (LRB):  REPAIR, HERNIA, UMBILICAL, LAPAROSCOPIC (N/A)      Surgeons and Role:     * Vincent Harman MD - Primary     * Mary Menendez MD - Resident - Assisting     * Berto Whitehead MD - Resident - Assisting        Pre-Operative Diagnosis: Primary ventral hernia    Post-Operative Diagnosis: Post-Op Diagnosis Codes:     * Umbilical hernia without obstruction and without gangrene [K42.9]    Anesthesia: General/MAC    Operative Findings (including complications, if any):  [id number] ventral hernia with incarcerated omental fat    Description of Technical Procedures:   The patient was brought into the operating room and placed under general anesthesia.A mendez was placed. The abdomen was prepped and draped in the usual sterile fashion. A time out performed.    The abdomen was entered in the LUQ with veress needle and insufflated. The veress was removed and a 5mm optical trocar inserted through the same incision. There was no injury from entry visualized. The umbilical defect was examined and had incarcerated omentum without bowel. Two additional 5mm trocars were placed under direct visualization in the RUQ. The hernia was reduced with gentle circumferential traction. Once reduced the defect measured approximately 3cm x 3cm. The defect was closed primarily with PMI 0 ethibond figure of eight sutures. The 5mm LUQ trocar was exchanged for a 12mm trocar to facilitate insertion of mesh. A 15cm round Ventralight ST mesh was chosen to provide adequate overlap for the defect. The umbilical ligament was taken down from the abdominal wall using harmonic device. The mesh was fixated circumferentially with sorbafix tacks.There was good apposition of mesh to the abdominal wall. The 12mm trocar site was closed with 0 vicryl PMI. The skin incisions were closed with 4-0 monocryl and  covered with dermabond. The umbilicus was covered with pressure dressing.     Counts were correct.  Dr. Harman was present for all critical portions of the case.      Estimated Blood Loss (EBL): 10ml           Implants:   Implant Name Type Inv. Item Serial No.  Lot No. LRB No. Used Action   MESH VENTRALIGHT ST 4 X 6 - [id number]  MESH VENTRALIGHT ST 4 X 6  [employer] [id number] N/A 1 Implanted       Specimens:   Specimen (24h ago, onward)      None                    Condition: Good    Disposition: PACU - hemodynamically stable.

## 2024-08-23 VITALS
TEMPERATURE: 98 F | RESPIRATION RATE: 18 BRPM | OXYGEN SATURATION: 99 % | DIASTOLIC BLOOD PRESSURE: 93 MMHG | HEART RATE: 84 BPM | SYSTOLIC BLOOD PRESSURE: 139 MMHG | WEIGHT: 224 LBS | BODY MASS INDEX: 32.14 KG/M2

## 2024-08-29 NOTE — ANESTHESIA POSTPROCEDURE EVALUATION
Anesthesia Post Evaluation    Patient: Akbar Roy    Procedure(s) Performed: Procedure(s) (LRB):  REPAIR, HERNIA, UMBILICAL, LAPAROSCOPIC (N/A)    Final Anesthesia Type: general      Patient location during evaluation: DOSC  Post-procedure vital signs: reviewed and stable  Airway patency: patent      Anesthetic complications: no      Cardiovascular status: hemodynamically stable  Respiratory status: spontaneous ventilation  Follow-up not needed.              Vitals Value Taken Time   /93 08/21/24 1600   Temp 36.9 °C (98.4 °F) 08/21/24 1600   Pulse 84 08/21/24 1600   Resp 18 08/21/24 1600   SpO2 99 % 08/21/24 1600         Event Time   Out of Recovery 14:28:00         Pain/Chayo Score: No data recorded

## 2024-09-05 ENCOUNTER — OFFICE VISIT (OUTPATIENT)
Dept: SURGERY | Facility: CLINIC | Age: 59
End: 2024-09-05
Payer: MEDICAID

## 2024-09-05 VITALS
WEIGHT: 225 LBS | BODY MASS INDEX: 32.21 KG/M2 | SYSTOLIC BLOOD PRESSURE: 122 MMHG | TEMPERATURE: 99 F | HEIGHT: 70 IN | DIASTOLIC BLOOD PRESSURE: 85 MMHG | HEART RATE: 74 BPM | OXYGEN SATURATION: 96 %

## 2024-09-05 DIAGNOSIS — Z09 S/P UMBILICAL HERNIA REPAIR, FOLLOW-UP EXAM: Primary | ICD-10-CM

## 2024-09-05 PROCEDURE — 99213 OFFICE O/P EST LOW 20 MIN: CPT | Mod: PBBFAC

## 2024-09-05 NOTE — PROGRESS NOTES
[facility]   Clinic Note       CC: umbilical hernia repair 2w follow up       HPI: Akbar Roy is a 58 y.o. male with PMHx of nephrolithiasis and gout s/p umbilical hernia repair 8/21 presents for post-op follow up. Patient reports diarrhea during the first week post-op, but they have since been having formed stools. Endorses some abdominal pain, especially with coughing, but pain has been controlled with tylenol and ibuprofen. Reports some nausea last week controlled with zofran, now resolved. Denies fever, chills, vomiting, chest pain, SOB. Tolerating regular diet. Denies pain, drainage, skin changes from incision sites.    PMH:  History reviewed. No pertinent past medical history.      PSH:  Past Surgical History:   Procedure Laterality Date    HERNIA REPAIR  1991    LAPAROSCOPIC REPAIR OF UMBILICAL HERNIA N/A 08/21/2024    Procedure: REPAIR, HERNIA, UMBILICAL, LAPAROSCOPIC;  Surgeon: Vincent Harman MD;  Location: [facility];  Service: General;  Laterality: N/A;    VASECTOMY  1991         Medications:  Current Outpatient Medications on File Prior to Visit   Medication Sig Dispense Refill    ondansetron (ZOFRAN-ODT) 4 MG TbDL Take 1 tablet (4 mg total) by mouth 2 (two) times daily. (Patient not taking: Reported on 9/5/2024) 20 tablet 0    oxyCODONE (ROXICODONE) 5 MG immediate release tablet Take 1 tablet (5 mg total) by mouth every 6 (six) hours as needed for Pain. (Patient not taking: Reported on 9/5/2024) 16 tablet 0     No current facility-administered medications on file prior to visit.        Allergies:  Review of patient's allergies indicates:  No Known Allergies      Social Hx:  Social History     Tobacco Use   Smoking Status Never   Smokeless Tobacco Never      Social History     Substance and Sexual Activity   Alcohol Use Not Currently    Alcohol/week: 2.0 standard drinks of alcohol    Types: 2 Cans of beer per week    Comment: Solo ventura un poquito ocasionalmente         Relevant Family  Hx:  No family history on file.       Physical Exam:   Vitals:    09/05/24 1059   BP: 122/85   Pulse: 74   Temp: 98.6 °F (37 °C)      Gen: NAD, AAOx3   Eye: EOMI   CV: RR  Pulm: NWOB on RA  Abd: soft, non-tender, non-distended, laparoscopic surgical incisions c/d/I, no erythema or drainage. Umbilicus with no fascial defects palpated.  Ext:  Move all 4 extremities.         A/P: Akbar Roy is a 58 y.o. male with PMHx of nephrolithiasis and gout s/p umbilical hernia repair 8/21 presenting for post-op follow up. Reports resolved nausea and diarrhea, some abdominal pain, no fever, chills, vomiting, chest pain, SOB; tolerating regular diet. Surgical incisions healing well.      - No heavy lifting over 10lbs for 6 weeks from surgery  - Normal diet, tylenol and ibuprofen for pain control  - Miralax daily to soften stools  - Follow up PRN    Gunjan Jerome  MS3    Attestation   I have seen and examined the patient with the medical student above. The above note was edited as necessary. I agree with above assessment and plan.       Karen Holcomb MD  [employer] General Surgery, PGY-3

## 2024-09-05 NOTE — PROGRESS NOTES
I have reviewed the notes, assessments, and/or procedures performed by Aicha, I concur with her/his documentation of Akbar Roy.  Date of Service: 9/5/2024    [facility]

## 2024-09-05 NOTE — PROGRESS NOTES
[facility]   Clinic Note       CC: umbilical hernia repair 2w follow up       HPI: Akbar Roy is a 58 y.o. male with PMHx of nephrolithiasis and gout s/p umbilical hernia repair 8/21 presents for post-op follow up. Patient reports diarrhea during the first week post-op, but they have since returned to normal. Endorses some abdominal pain, especially with coughing, but has not needed narcotic pain meds to control it. Reports some nausea last week controlled with zofran, now resolved. Denies fever, chills, vomiting, chest pain, SOB. Tolerating regular diet. Denies pain, drainage, skin changes from incision sites.    PMH:  History reviewed. No pertinent past medical history.      PSH:  Past Surgical History:   Procedure Laterality Date    HERNIA REPAIR  1991    LAPAROSCOPIC REPAIR OF UMBILICAL HERNIA N/A 08/21/2024    Procedure: REPAIR, HERNIA, UMBILICAL, LAPAROSCOPIC;  Surgeon: Vincent Harman MD;  Location: [facility];  Service: General;  Laterality: N/A;    VASECTOMY  1991         Medications:  Current Outpatient Medications on File Prior to Visit   Medication Sig Dispense Refill    ondansetron (ZOFRAN-ODT) 4 MG TbDL Take 1 tablet (4 mg total) by mouth 2 (two) times daily. (Patient not taking: Reported on 9/5/2024) 20 tablet 0    oxyCODONE (ROXICODONE) 5 MG immediate release tablet Take 1 tablet (5 mg total) by mouth every 6 (six) hours as needed for Pain. (Patient not taking: Reported on 9/5/2024) 16 tablet 0     No current facility-administered medications on file prior to visit.        Allergies:  Review of patient's allergies indicates:  No Known Allergies      Social Hx:  Social History     Tobacco Use   Smoking Status Never   Smokeless Tobacco Never      Social History     Substance and Sexual Activity   Alcohol Use Not Currently    Alcohol/week: 2.0 standard drinks of alcohol    Types: 2 Cans of beer per week    Comment: Solo ventura un poquito ocasionalmente         Relevant Family Hx:  No family  history on file.       Physical Exam:   Vitals:    09/05/24 1059   BP: 122/85   Pulse: 74   Temp: 98.6 °F (37 °C)      Gen: NAD, AAOx3   Eye: EOMI   CV: RR  Pulm: NWOB on RA  Abd: soft, non-tender, non-distended, surgical incisions healing well  Ext:  Move all 4 extremities.         A/P: Akbar Roy is a 58 y.o. male with PMHx of nephrolithiasis and gout s/p umbilical hernia repair 8/21 presenting for post-op follow up. Reports resolved nausea and diarrhea, some abdominal pain, no fever, chills, vomiting, chest pain, SOB; tolerating regular diet. Surgical incisions healing well.      - No heavy lifting over 10lbs for 6 weeks from surgery  - Normal diet, tylenol and ibuprofen for pain control  - Miralax daily to soften stools  - Follow up PRN    Gunjan Jerome  MS3

## 2024-10-17 ENCOUNTER — PATIENT MESSAGE (OUTPATIENT)
Dept: RESEARCH | Facility: HOSPITAL | Age: 59
End: 2024-10-17
Payer: MEDICAID

## (undated) DEVICE — APPLICATOR CHLORAPREP ORN 26ML

## (undated) DEVICE — TROCAR ENDOPATH XCEL 5X100MM

## (undated) DEVICE — NDL PNEUMO INSUFFLATI 120MM

## (undated) DEVICE — SUT VICRYL+ 27 UR-6 VIOL

## (undated) DEVICE — CLIPPER BLADE MOD 4406 (CAREF)

## (undated) DEVICE — SHEARS HARMONIC CRVD TIP 36CM

## (undated) DEVICE — SYR 10CC LUER LOCK

## (undated) DEVICE — NDL HYPO REG 25G X 1 1/2

## (undated) DEVICE — GLOVE SIGNATURE MICRO LTX 7

## (undated) DEVICE — GLOVE SIGNATURE MICRO LTX 6.5

## (undated) DEVICE — GLOVE SENSICARE NEOPRENE 6.5

## (undated) DEVICE — KIT LAPAROSCOPY UNIVERSITY

## (undated) DEVICE — GLOVE SIGNATURE MICRO LTX 7.5

## (undated) DEVICE — SORBAFIX 30 COUNT.

## (undated) DEVICE — TRAY CATH FOL SIL URIMTR 16FR

## (undated) DEVICE — APPLICATOR STRL COT 2INNR 6IN

## (undated) DEVICE — TROCAR LAPSCP XCEL 12MM 10CM

## (undated) DEVICE — NDL GRANEE OPEN LOOP GRASPER

## (undated) DEVICE — SUT PDS II O CT-2 VIL MONO

## (undated) DEVICE — KIT SURGICAL TURNOVER

## (undated) DEVICE — GLOVE SIGNATURE MICRO LTX 6

## (undated) DEVICE — SOL IRRI STRL WATER 1000ML

## (undated) DEVICE — ADHESIVE DERMABOND ADVANCED

## (undated) DEVICE — SUT X425H ETHIBOND 1-0

## (undated) DEVICE — CANNULA ENDOPATH XCEL 5X100MM

## (undated) DEVICE — GLOVE SENSICARE PI GRN 7.5

## (undated) DEVICE — BINDER ABDOMINAL UNIV XLN 10IN

## (undated) DEVICE — SUT MCRYL PLUS 4-0 PS2 27IN

## (undated) DEVICE — GLOVE SENSICARE PI GRN 6.5

## (undated) DEVICE — GOWN POLY REINF BRTH SLV XL